# Patient Record
Sex: FEMALE | Race: WHITE | NOT HISPANIC OR LATINO | Employment: OTHER | URBAN - METROPOLITAN AREA
[De-identification: names, ages, dates, MRNs, and addresses within clinical notes are randomized per-mention and may not be internally consistent; named-entity substitution may affect disease eponyms.]

---

## 2018-06-08 ENCOUNTER — CONSULT (OUTPATIENT)
Dept: ONCOLOGY | Facility: CLINIC | Age: 68
End: 2018-06-08

## 2018-06-08 ENCOUNTER — LAB (OUTPATIENT)
Dept: LAB | Facility: HOSPITAL | Age: 68
End: 2018-06-08

## 2018-06-08 VITALS
SYSTOLIC BLOOD PRESSURE: 151 MMHG | HEART RATE: 69 BPM | BODY MASS INDEX: 36.19 KG/M2 | WEIGHT: 212 LBS | RESPIRATION RATE: 18 BRPM | OXYGEN SATURATION: 98 % | DIASTOLIC BLOOD PRESSURE: 71 MMHG | TEMPERATURE: 97.8 F | HEIGHT: 64 IN

## 2018-06-08 DIAGNOSIS — D69.6 THROMBOCYTOPENIA (HCC): Primary | ICD-10-CM

## 2018-06-08 DIAGNOSIS — D69.6 THROMBOCYTOPENIA (HCC): ICD-10-CM

## 2018-06-08 PROBLEM — D75.839 THROMBOCYTOSIS: Status: ACTIVE | Noted: 2018-06-08

## 2018-06-08 LAB
ERYTHROCYTE [DISTWIDTH] IN BLOOD BY AUTOMATED COUNT: 13.3 % (ref 11.3–14.5)
HCT VFR BLD AUTO: 45.3 % (ref 34.5–44)
HGB BLD-MCNC: 14.6 G/DL (ref 11.5–15.5)
LYMPHOCYTES # BLD AUTO: 2.2 10*3/MM3 (ref 0.6–4.8)
LYMPHOCYTES NFR BLD AUTO: 32.3 % (ref 24–44)
MCH RBC QN AUTO: 32 PG (ref 27–31)
MCHC RBC AUTO-ENTMCNC: 32.3 G/DL (ref 32–36)
MCV RBC AUTO: 99.1 FL (ref 80–99)
MONOCYTES # BLD AUTO: 0.6 10*3/MM3 (ref 0–1)
MONOCYTES NFR BLD AUTO: 9 % (ref 0–12)
NEUTROPHILS # BLD AUTO: 4.1 10*3/MM3 (ref 1.5–8.3)
NEUTROPHILS NFR BLD AUTO: 58.7 % (ref 41–71)
PLATELET # BLD AUTO: 119 10*3/MM3 (ref 150–450)
PLATELETS (CITRATED) BY AUTOMATED COUNT: 108 10*3/MM3 (ref 150–450)
PMV BLD AUTO: 8.3 FL (ref 6–12)
RBC # BLD AUTO: 4.58 10*6/MM3 (ref 3.89–5.14)
VIT B12 BLD-MCNC: 683 PG/ML (ref 211–911)
WBC NRBC COR # BLD: 6.9 10*3/MM3 (ref 3.5–10.8)

## 2018-06-08 PROCEDURE — 36415 COLL VENOUS BLD VENIPUNCTURE: CPT

## 2018-06-08 PROCEDURE — 85025 COMPLETE CBC W/AUTO DIFF WBC: CPT

## 2018-06-08 PROCEDURE — 82607 VITAMIN B-12: CPT

## 2018-06-08 PROCEDURE — 85049 AUTOMATED PLATELET COUNT: CPT

## 2018-06-08 PROCEDURE — 99204 OFFICE O/P NEW MOD 45 MIN: CPT | Performed by: INTERNAL MEDICINE

## 2018-06-08 RX ORDER — ASPIRIN 81 MG/1
81 TABLET ORAL DAILY
COMMUNITY

## 2018-06-08 RX ORDER — VALSARTAN AND HYDROCHLOROTHIAZIDE 320; 25 MG/1; MG/1
1 TABLET, FILM COATED ORAL DAILY
COMMUNITY

## 2018-06-08 RX ORDER — FUROSEMIDE 20 MG/1
20 TABLET ORAL AS NEEDED
COMMUNITY

## 2018-06-08 RX ORDER — FAMOTIDINE 40 MG/1
40 TABLET, FILM COATED ORAL DAILY
COMMUNITY

## 2018-06-08 RX ORDER — ESCITALOPRAM OXALATE 10 MG/1
10 TABLET ORAL DAILY
COMMUNITY

## 2018-06-08 RX ORDER — POTASSIUM CHLORIDE 750 MG/1
20 TABLET, FILM COATED, EXTENDED RELEASE ORAL DAILY
COMMUNITY

## 2018-06-08 NOTE — PROGRESS NOTES
ID: 67 y.o. year old female from [facility] [id number]    PCP: KENNETH Cr    REFERRING PHYSICIAN: KENNETH Cr    Reason for Consultation: Thrombocytopenia    Dear Ms. Edwards and Dr. Conn    It is a pleasure to meet Ms. Ford today.  As you know she is a very pleasant 67-year-old lady with diabetes who I'm seeing in consultation with newly diagnosed thrombocytopenia.  She reports no significant infections recently.  No fevers or chills.  No rashes.  She did start Pepcid recently.  However this predates that.  She denies any alcohol use.      Past Medical History:   Diagnosis Date   • Anxiety    • Arthritis    • Broken heart syndrome    • Diabetes    • High blood pressure    • Murmur        Past Surgical History:   Procedure Laterality Date   • BACK SURGERY  2005   • CHOLECYSTECTOMY OPEN     • HYSTERECTOMY      bso   • TUBAL ABDOMINAL LIGATION         Social History     Social History   • Marital status:    • Number of children: 3     Social History Main Topics   • Smoking status: Never Smoker   • Alcohol use No   • Drug use: No   • Sexual activity: Defer     Other Topics Concern   • Not on file       Family History   Problem Relation Age of Onset   • Aneurysm Mother    • Aneurysm Maternal Uncle        Review of Systems:    16 point review of systems was performed and reviewed and scanned into the EMR      Current Outpatient Prescriptions:   •  aspirin 81 MG EC tablet, Take 81 mg by mouth Daily., Disp: , Rfl:   •  Calcium Carbonate-Vitamin D (CALTRATE 600+D PO), Take  by mouth 2 (Two) Times a Day., Disp: , Rfl:   •  CINNAMON PO, Take 1,000 mg by mouth Daily., Disp: , Rfl:   •  escitalopram (LEXAPRO) 10 MG tablet, Take 10 mg by mouth Daily., Disp: , Rfl:   •  famotidine (PEPCID) 40 MG tablet, Take 40 mg by mouth Daily., Disp: , Rfl:   •  furosemide (LASIX) 20 MG tablet, Take 20 mg by mouth As Needed., Disp: , Rfl:   •  metFORMIN (GLUCOPHAGE) 500 MG tablet, Take 500 mg by mouth  Daily., Disp: , Rfl:   •  potassium chloride (K-DUR) 10 MEQ CR tablet, Take 20 mEq by mouth 2 (Two) Times a Day., Disp: , Rfl:   •  valsartan-hydrochlorothiazide (DIOVAN-HCT) 320-25 MG per tablet, Take 1 tablet by mouth Daily., Disp: , Rfl:     Allergies   Allergen Reactions   • Sulfa Antibiotics Nausea And Vomiting       ECOG SCORE: 0    Objective     Vitals:    06/08/18 1058   BP: 151/71   Pulse: 69   Resp: 18   Temp: 97.8 °F (36.6 °C)   SpO2: 98%       Physical Exam    General: well appearing, in no acute distress  HEENT: sclera anicteric, oropharynx clear, neck is supple  Lymphatics: no cervical, supraclavicular, or axillary adenopathy  Cardiovascular: regular rate and rhythm, no murmurs, rubs or gallops  Lungs: clear to auscultation bilaterally  Abdomen: soft, nontender, nondistended.  No palpable organomegaly  Extremities: no lower extremity edema  Skin: no rashes, lesions, bruising, or petechiae  Msk:  Shows no weakness of the large muscle groups  Psych: Mood is stable          Lab Results   Component Value Date    HGB 14.6 06/08/2018    HCT 45.3 (H) 06/08/2018    MCV 99.1 (H) 06/08/2018     (L) 06/08/2018     (L) 06/08/2018    WBC 6.90 06/08/2018    NEUTROABS 4.10 06/08/2018    LYMPHSABS 2.20 06/08/2018    MONOSABS 0.60 06/08/2018       ASSESSMENT:    67-year-old lady with mild thrombocytopenia.  This is likely related to an underlying viral infection.  It has improved since she was last checked.  There is also a possibility may be low-grade ITP.  Regardless no other cell lines affected I don't feel that she has a primary bone marrow process.  I think it's reasonable to just to close surveillance on her right now.  If she had ITP, we would start steroids only if her platelets drop below 50,000.  At a level of 108,000 she should behave fairly normal.  There should be no increased risk of bleeding and could undergo numerous surgeries without any transfusion support.  I went over the differential  with her today.  I explained the different reasons why platelets can be low.  We also have a plan going forward where we will perform a repeat lab in 6 weeks to see if this has been corrected.    I spent 45 minutes on the patient's plan and care with more than 50% of the time spent counseling the patient.    Thank you for allowing me to participate in the care of this patient.    Yours sincerely,    Stella Mock MD  [facility]  Hematology and Oncology    Orders Placed This Encounter   Procedures   • Platelet Ct On Citrated Bld     Standing Status:   Future     Number of Occurrences:   1     Standing Expiration Date:   6/8/2019   • Vitamin B12     Standing Status:   Future     Number of Occurrences:   1     Standing Expiration Date:   6/8/2019   • CBC & Differential     Standing Status:   Future     Number of Occurrences:   1     Standing Expiration Date:   6/8/2019     Order Specific Question:   Manual Differential     Answer:   No   • CBC & Differential     Standing Status:   Future     Standing Expiration Date:   6/8/2019     Order Specific Question:   Manual Differential     Answer:   No       Please note that portions of this note may have been completed with a voice recognition program. Efforts were made to edit the dictations, but occasionally words are mistranscribed.

## 2018-07-20 ENCOUNTER — OFFICE VISIT (OUTPATIENT)
Dept: ONCOLOGY | Facility: CLINIC | Age: 68
End: 2018-07-20

## 2018-07-20 ENCOUNTER — LAB (OUTPATIENT)
Dept: LAB | Facility: HOSPITAL | Age: 68
End: 2018-07-20

## 2018-07-20 VITALS
SYSTOLIC BLOOD PRESSURE: 145 MMHG | DIASTOLIC BLOOD PRESSURE: 70 MMHG | WEIGHT: 214 LBS | HEIGHT: 64 IN | BODY MASS INDEX: 36.54 KG/M2 | RESPIRATION RATE: 18 BRPM | HEART RATE: 66 BPM | TEMPERATURE: 97.8 F

## 2018-07-20 DIAGNOSIS — D69.6 THROMBOCYTOPENIA (HCC): ICD-10-CM

## 2018-07-20 DIAGNOSIS — D69.6 THROMBOCYTOPENIA (HCC): Primary | ICD-10-CM

## 2018-07-20 LAB
ERYTHROCYTE [DISTWIDTH] IN BLOOD BY AUTOMATED COUNT: 13.1 % (ref 11.3–14.5)
HCT VFR BLD AUTO: 43.6 % (ref 34.5–44)
HGB BLD-MCNC: 14.5 G/DL (ref 11.5–15.5)
LYMPHOCYTES # BLD AUTO: 1.8 10*3/MM3 (ref 0.6–4.8)
LYMPHOCYTES NFR BLD AUTO: 33.4 % (ref 24–44)
MCH RBC QN AUTO: 32.9 PG (ref 27–31)
MCHC RBC AUTO-ENTMCNC: 33.3 G/DL (ref 32–36)
MCV RBC AUTO: 98.9 FL (ref 80–99)
MONOCYTES # BLD AUTO: 0.5 10*3/MM3 (ref 0–1)
MONOCYTES NFR BLD AUTO: 8.9 % (ref 0–12)
NEUTROPHILS # BLD AUTO: 3.2 10*3/MM3 (ref 1.5–8.3)
NEUTROPHILS NFR BLD AUTO: 57.7 % (ref 41–71)
PLATELET # BLD AUTO: 107 10*3/MM3 (ref 150–450)
PMV BLD AUTO: 8 FL (ref 6–12)
RBC # BLD AUTO: 4.41 10*6/MM3 (ref 3.89–5.14)
WBC NRBC COR # BLD: 5.5 10*3/MM3 (ref 3.5–10.8)

## 2018-07-20 PROCEDURE — 36415 COLL VENOUS BLD VENIPUNCTURE: CPT

## 2018-07-20 PROCEDURE — 86677 HELICOBACTER PYLORI ANTIBODY: CPT

## 2018-07-20 PROCEDURE — 85025 COMPLETE CBC W/AUTO DIFF WBC: CPT

## 2018-07-20 PROCEDURE — 99213 OFFICE O/P EST LOW 20 MIN: CPT | Performed by: INTERNAL MEDICINE

## 2018-07-20 NOTE — PROGRESS NOTES
"PROBLEM LIST:  1 mild thrombocytopenia    REASON FOR VISIT: Thrombocytopenia    HISTORY OF PRESENT ILLNESS:   67 y.o.  female presents today for follow-up of her thrombocytopenia.  Clinically doing well.  No significant bruising.  No recent infections.    Past medical history, social history and family history was reviewed and unchanged from prior visit.    Review of Systems:    Review of Systems - Oncology   A comprehensive 14 point review of systems was performed and was negative except as mentioned.      Medications:  The current medication list was reviewed in the EMR    ALLERGIES:    Allergies   Allergen Reactions   • Sulfa Antibiotics Nausea And Vomiting         Physical Exam    VITAL SIGNS:  /70 Comment: LUE  Pulse 66   Temp 97.8 °F (36.6 °C) (Temporal Artery )   Resp 18   Ht 162.6 cm (64\")   Wt 97.1 kg (214 lb)   BMI 36.73 kg/m²     Wt Readings from Last 3 Encounters:   07/20/18 97.1 kg (214 lb)   06/08/18 96.2 kg (212 lb)        Performance Status: 0    General: well appearing, in no acute distress  HEENT: sclera anicteric, oropharynx clear, neck is supple  Lymphatics: no cervical, supraclavicular, or axillary adenopathy  Cardiovascular: regular rate and rhythm, no murmurs, rubs or gallops  Lungs: clear to auscultation bilaterally  Abdomen: soft, nontender, nondistended.  No palpable organomegaly  Extremities: no lower extremity edema  Skin: no rashes, lesions, bruising, or petechiae  Msk:  Shows no weakness of the large muscle groups  Psych: Mood is stable        RECENT LABS:    Lab Results   Component Value Date    HGB 14.5 07/20/2018    HCT 43.6 07/20/2018    MCV 98.9 07/20/2018     (L) 07/20/2018    WBC 5.50 07/20/2018    NEUTROABS 3.20 07/20/2018    LYMPHSABS 1.80 07/20/2018    MONOSABS 0.50 07/20/2018     Lab Results   Component Value Date     (L) 07/20/2018     (L) 06/08/2018     (L) 06/08/2018       Assessment/Plan    1.  Thrombocytopenia: Her platelet counts " are fairly stable.  I feel she may have underlying ITP.  I'm going to check an ultrasound of her abdomen and an H. pylori to see if those may be causing a problem.  Otherwise no changes for now.  We'll do observation only and see her back in 4 months.    I spent 15 minutes on the patient's plan and care with more than 50% of time spent counseling the patient.    Stella Mock MD  [facility] Hematology and Oncology    Return on: 11/16/18  Return in (Approximately): 4 months    Orders Placed This Encounter   Procedures   • US Abdomen Complete   • H. Pylori IgM, Blood   • CBC & Differential       7/20/2018         Please note that portions of this note may have been completed with a voice recognition program. Efforts were made to edit the dictations, but occasionally words are mistranscribed.

## 2018-07-23 LAB — H PYLORI IGM SER-ACNC: <9 UNITS (ref 0–8.9)

## 2018-11-08 ENCOUNTER — HOSPITAL ENCOUNTER (OUTPATIENT)
Age: 68
End: 2018-11-08
Payer: MEDICARE

## 2018-11-08 DIAGNOSIS — Z12.31: Primary | ICD-10-CM

## 2018-11-08 PROCEDURE — 77067 SCR MAMMO BI INCL CAD: CPT

## 2018-11-16 ENCOUNTER — OFFICE VISIT (OUTPATIENT)
Dept: ONCOLOGY | Facility: CLINIC | Age: 68
End: 2018-11-16

## 2018-11-16 ENCOUNTER — LAB (OUTPATIENT)
Dept: LAB | Facility: HOSPITAL | Age: 68
End: 2018-11-16

## 2018-11-16 VITALS
OXYGEN SATURATION: 96 % | RESPIRATION RATE: 18 BRPM | BODY MASS INDEX: 37.39 KG/M2 | SYSTOLIC BLOOD PRESSURE: 142 MMHG | HEART RATE: 72 BPM | HEIGHT: 64 IN | TEMPERATURE: 97.7 F | WEIGHT: 219 LBS | DIASTOLIC BLOOD PRESSURE: 68 MMHG

## 2018-11-16 DIAGNOSIS — D69.6 THROMBOCYTOPENIA (HCC): Primary | ICD-10-CM

## 2018-11-16 DIAGNOSIS — D69.6 THROMBOCYTOPENIA (HCC): ICD-10-CM

## 2018-11-16 LAB
ERYTHROCYTE [DISTWIDTH] IN BLOOD BY AUTOMATED COUNT: 13.3 % (ref 11.3–14.5)
HCT VFR BLD AUTO: 41.8 % (ref 34.5–44)
HGB BLD-MCNC: 14.2 G/DL (ref 11.5–15.5)
LYMPHOCYTES # BLD AUTO: 1.7 10*3/MM3 (ref 0.6–4.8)
LYMPHOCYTES NFR BLD AUTO: 33.6 % (ref 24–44)
MCH RBC QN AUTO: 34.1 PG (ref 27–31)
MCHC RBC AUTO-ENTMCNC: 33.9 G/DL (ref 32–36)
MCV RBC AUTO: 100.5 FL (ref 80–99)
MONOCYTES # BLD AUTO: 0.5 10*3/MM3 (ref 0–1)
MONOCYTES NFR BLD AUTO: 8.7 % (ref 0–12)
NEUTROPHILS # BLD AUTO: 3 10*3/MM3 (ref 1.5–8.3)
NEUTROPHILS NFR BLD AUTO: 57.7 % (ref 41–71)
PLATELET # BLD AUTO: 113 10*3/MM3 (ref 150–450)
PMV BLD AUTO: 8.1 FL (ref 6–12)
RBC # BLD AUTO: 4.16 10*6/MM3 (ref 3.89–5.14)
WBC NRBC COR # BLD: 5.2 10*3/MM3 (ref 3.5–10.8)

## 2018-11-16 PROCEDURE — 85025 COMPLETE CBC W/AUTO DIFF WBC: CPT

## 2018-11-16 PROCEDURE — 36415 COLL VENOUS BLD VENIPUNCTURE: CPT

## 2018-11-16 PROCEDURE — 99213 OFFICE O/P EST LOW 20 MIN: CPT | Performed by: INTERNAL MEDICINE

## 2018-11-16 RX ORDER — LOSARTAN POTASSIUM AND HYDROCHLOROTHIAZIDE 25; 100 MG/1; MG/1
1 TABLET ORAL DAILY
COMMUNITY

## 2018-11-16 NOTE — PROGRESS NOTES
"PROBLEM LIST:  1 mild thrombocytopenia  2. Portal Hypertension    REASON FOR VISIT: Thrombocytopenia    HISTORY OF PRESENT ILLNESS:   68 y.o.  female presents today for follow-up of her thrombocytopenia.  Clinically doing well.  No significant bruising.  No recent infections.    Past medical history, social history and family history was reviewed and unchanged from prior visit.    Review of Systems:    Review of Systems - Oncology   A comprehensive 14 point review of systems was performed and was negative except as mentioned.      Medications:  The current medication list was reviewed in the EMR    ALLERGIES:    Allergies   Allergen Reactions   • Sulfa Antibiotics Nausea And Vomiting         Physical Exam    VITAL SIGNS:  /68 Comment: LUE  Pulse 72   Temp 97.7 °F (36.5 °C) (Temporal)   Resp 18   Ht 162.6 cm (64\")   Wt 99.3 kg (219 lb)   SpO2 96% Comment: RA  BMI 37.59 kg/m²     Wt Readings from Last 3 Encounters:   11/16/18 99.3 kg (219 lb)   07/20/18 97.1 kg (214 lb)   06/08/18 96.2 kg (212 lb)        Performance Status: 0    General: well appearing, in no acute distress  HEENT: sclera anicteric, oropharynx clear, neck is supple  Lymphatics: no cervical, supraclavicular, or axillary adenopathy  Cardiovascular: regular rate and rhythm, no murmurs, rubs or gallops  Lungs: clear to auscultation bilaterally  Abdomen: soft, nontender, nondistended.  No palpable organomegaly  Extremities: no lower extremity edema  Skin: no rashes, lesions, bruising, or petechiae  Msk:  Shows no weakness of the large muscle groups  Psych: Mood is stable        RECENT LABS:    Lab Results   Component Value Date    HGB 14.2 11/16/2018    HCT 41.8 11/16/2018    .5 (H) 11/16/2018     (L) 11/16/2018    WBC 5.20 11/16/2018    NEUTROABS 3.00 11/16/2018    LYMPHSABS 1.70 11/16/2018    MONOSABS 0.50 11/16/2018     Lab Results   Component Value Date     (L) 11/16/2018     (L) 07/20/2018     (L) " 06/08/2018     (L) 06/08/2018         Assessment/Plan    1.  Thrombocytopenia: Her platelet counts are fairly stable.  I feel she may have underlying ITP though Her ultrasound of the liver suggest a component of portal hypertension.  This would suggest some sequela of fatty liver disease.  I have recommended consultation with hepatology.  She wants to wait and see.    I spent 15 minutes on the patient's plan and care with more than 50% of time spent counseling the patient.    Stella Mock MD  [facility] Hematology and Oncology    Return on: 05/10/19  Return in (Approximately): 6 months    Orders Placed This Encounter   Procedures   • Comprehensive Metabolic Panel   • CBC & Differential       11/16/2018         Please note that portions of this note may have been completed with a voice recognition program. Efforts were made to edit the dictations, but occasionally words are mistranscribed.

## 2019-05-10 ENCOUNTER — OFFICE VISIT (OUTPATIENT)
Dept: ONCOLOGY | Facility: CLINIC | Age: 69
End: 2019-05-10

## 2019-05-10 ENCOUNTER — LAB (OUTPATIENT)
Dept: LAB | Facility: HOSPITAL | Age: 69
End: 2019-05-10

## 2019-05-10 VITALS
HEIGHT: 64 IN | BODY MASS INDEX: 37.73 KG/M2 | TEMPERATURE: 98.5 F | WEIGHT: 221 LBS | DIASTOLIC BLOOD PRESSURE: 75 MMHG | OXYGEN SATURATION: 94 % | HEART RATE: 65 BPM | SYSTOLIC BLOOD PRESSURE: 145 MMHG | RESPIRATION RATE: 18 BRPM

## 2019-05-10 DIAGNOSIS — D69.6 THROMBOCYTOPENIA (HCC): Primary | ICD-10-CM

## 2019-05-10 DIAGNOSIS — D69.6 THROMBOCYTOPENIA (HCC): ICD-10-CM

## 2019-05-10 LAB
ALBUMIN SERPL-MCNC: 3.8 G/DL (ref 3.5–5.2)
ALBUMIN/GLOB SERPL: 1 G/DL
ALP SERPL-CCNC: 120 U/L (ref 39–117)
ALT SERPL W P-5'-P-CCNC: 46 U/L (ref 1–33)
ANION GAP SERPL CALCULATED.3IONS-SCNC: 11 MMOL/L
AST SERPL-CCNC: 61 U/L (ref 1–32)
BILIRUB SERPL-MCNC: 0.9 MG/DL (ref 0.2–1.2)
BUN BLD-MCNC: 12 MG/DL (ref 8–23)
BUN/CREAT SERPL: 21.1 (ref 7–25)
CALCIUM SPEC-SCNC: 9.6 MG/DL (ref 8.6–10.5)
CHLORIDE SERPL-SCNC: 102 MMOL/L (ref 98–107)
CO2 SERPL-SCNC: 28 MMOL/L (ref 22–29)
CREAT BLD-MCNC: 0.57 MG/DL (ref 0.57–1)
ERYTHROCYTE [DISTWIDTH] IN BLOOD BY AUTOMATED COUNT: 12.9 % (ref 12.3–15.4)
GFR SERPL CREATININE-BSD FRML MDRD: 105 ML/MIN/1.73
GLOBULIN UR ELPH-MCNC: 3.7 GM/DL
GLUCOSE BLD-MCNC: 171 MG/DL (ref 65–99)
HCT VFR BLD AUTO: 42.3 % (ref 34–46.6)
HGB BLD-MCNC: 14.3 G/DL (ref 12–15.9)
LYMPHOCYTES # BLD AUTO: 1.6 10*3/MM3 (ref 0.7–3.1)
LYMPHOCYTES NFR BLD AUTO: 29.3 % (ref 19.6–45.3)
MCH RBC QN AUTO: 34.3 PG (ref 26.6–33)
MCHC RBC AUTO-ENTMCNC: 33.9 G/DL (ref 31.5–35.7)
MCV RBC AUTO: 101.1 FL (ref 79–97)
MONOCYTES # BLD AUTO: 0.3 10*3/MM3 (ref 0.1–0.9)
MONOCYTES NFR BLD AUTO: 4.9 % (ref 5–12)
NEUTROPHILS # BLD AUTO: 3.6 10*3/MM3 (ref 1.7–7)
NEUTROPHILS NFR BLD AUTO: 65.8 % (ref 42.7–76)
PLATELET # BLD AUTO: 96 10*3/MM3 (ref 140–450)
PMV BLD AUTO: 8.4 FL (ref 6–12)
POTASSIUM BLD-SCNC: 4 MMOL/L (ref 3.5–5.2)
PROT SERPL-MCNC: 7.5 G/DL (ref 6–8.5)
RBC # BLD AUTO: 4.18 10*6/MM3 (ref 3.77–5.28)
SODIUM BLD-SCNC: 141 MMOL/L (ref 136–145)
WBC NRBC COR # BLD: 5.4 10*3/MM3 (ref 3.4–10.8)

## 2019-05-10 PROCEDURE — 36415 COLL VENOUS BLD VENIPUNCTURE: CPT

## 2019-05-10 PROCEDURE — 85025 COMPLETE CBC W/AUTO DIFF WBC: CPT

## 2019-05-10 PROCEDURE — 80053 COMPREHEN METABOLIC PANEL: CPT

## 2019-05-10 PROCEDURE — 99213 OFFICE O/P EST LOW 20 MIN: CPT | Performed by: INTERNAL MEDICINE

## 2019-05-10 NOTE — PROGRESS NOTES
"PROBLEM LIST:  1 mild thrombocytopenia  2. Portal Hypertension    REASON FOR VISIT: Thrombocytopenia    HISTORY OF PRESENT ILLNESS:   68 y.o.  female presents today for follow-up of her thrombocytopenia.  Clinically doing well.  No significant bruising.  No recent infections.    Past medical history, social history and family history was reviewed and unchanged from prior visit.    Review of Systems:    Review of Systems   Constitutional: Negative.    HENT:  Negative.    Eyes: Negative.    Respiratory: Negative.    Cardiovascular: Negative.    Gastrointestinal: Negative.    Endocrine: Negative.    Genitourinary: Negative.     Musculoskeletal: Negative.    Skin: Negative.    Neurological: Negative.    Hematological: Negative.    Psychiatric/Behavioral: Negative.       A comprehensive 14 point review of systems was performed and was negative except as mentioned.      Medications:  The current medication list was reviewed in the EMR    ALLERGIES:    Allergies   Allergen Reactions   • Sulfa Antibiotics Nausea And Vomiting         Physical Exam    VITAL SIGNS:  /75 Comment: LUE  Pulse 65   Temp 98.5 °F (36.9 °C) (Temporal)   Resp 18   Ht 162.6 cm (64\")   Wt 100 kg (221 lb)   SpO2 94% Comment: RA  BMI 37.93 kg/m²     Wt Readings from Last 3 Encounters:   05/10/19 100 kg (221 lb)   11/16/18 99.3 kg (219 lb)   07/20/18 97.1 kg (214 lb)        Performance Status: 0    General: well appearing, in no acute distress  HEENT: sclera anicteric, oropharynx clear, neck is supple  Lymphatics: no cervical, supraclavicular, or axillary adenopathy  Cardiovascular: regular rate and rhythm, no murmurs, rubs or gallops  Lungs: clear to auscultation bilaterally  Abdomen: soft, nontender, nondistended.  No palpable organomegaly  Extremities: no lower extremity edema  Skin: no rashes, lesions, bruising, or petechiae  Msk:  Shows no weakness of the large muscle groups  Psych: Mood is stable        RECENT LABS:    Lab Results "   Component Value Date    HGB 14.3 05/10/2019    HCT 42.3 05/10/2019    .1 (H) 05/10/2019    PLT 96 (L) 05/10/2019    WBC 5.40 05/10/2019    NEUTROABS 3.60 05/10/2019    LYMPHSABS 1.60 05/10/2019    MONOSABS 0.30 05/10/2019     Lab Results   Component Value Date    PLT 96 (L) 05/10/2019     (L) 11/16/2018     (L) 07/20/2018     (L) 06/08/2018     (L) 06/08/2018         Assessment/Plan    1.  Thrombocytopenia: Her platelet counts are fairly stable.  I feel she may have underlying ITP though Her ultrasound of the liver suggest a component of portal hypertension.  This would suggest some sequela of fatty liver disease.  No intervention at this time.    I spent a total of 15 minutes in direct patient care, greater than 10  minutes (greater than 50%) were spent in coordination of care, and counseling the patient regarding thrombocytopenia. Answered any questions patient had with the plan.      Stella Mock MD  [facility] Hematology and Oncology    Return on: 11/15/19  Return in (Approximately): 6 months    Orders Placed This Encounter   Procedures   • CBC & Differential       5/10/2019         Please note that portions of this note may have been completed with a voice recognition program. Efforts were made to edit the dictations, but occasionally words are mistranscribed.

## 2019-11-15 ENCOUNTER — LAB (OUTPATIENT)
Dept: LAB | Facility: HOSPITAL | Age: 69
End: 2019-11-15

## 2019-11-15 ENCOUNTER — OFFICE VISIT (OUTPATIENT)
Dept: ONCOLOGY | Facility: CLINIC | Age: 69
End: 2019-11-15

## 2019-11-15 VITALS
HEIGHT: 64 IN | SYSTOLIC BLOOD PRESSURE: 139 MMHG | BODY MASS INDEX: 38.76 KG/M2 | WEIGHT: 227 LBS | OXYGEN SATURATION: 96 % | RESPIRATION RATE: 18 BRPM | TEMPERATURE: 97.9 F | DIASTOLIC BLOOD PRESSURE: 65 MMHG | HEART RATE: 54 BPM

## 2019-11-15 DIAGNOSIS — K76.6 PORTAL HYPERTENSION (HCC): ICD-10-CM

## 2019-11-15 DIAGNOSIS — D69.6 THROMBOCYTOPENIA (HCC): ICD-10-CM

## 2019-11-15 DIAGNOSIS — D69.6 THROMBOCYTOPENIA (HCC): Primary | ICD-10-CM

## 2019-11-15 LAB
ERYTHROCYTE [DISTWIDTH] IN BLOOD BY AUTOMATED COUNT: 13.9 % (ref 12.3–15.4)
HCT VFR BLD AUTO: 42.5 % (ref 34–46.6)
HGB BLD-MCNC: 14.2 G/DL (ref 12–15.9)
LYMPHOCYTES # BLD AUTO: 1.4 10*3/MM3 (ref 0.7–3.1)
LYMPHOCYTES NFR BLD AUTO: 29.6 % (ref 19.6–45.3)
MCH RBC QN AUTO: 34.3 PG (ref 26.6–33)
MCHC RBC AUTO-ENTMCNC: 33.5 G/DL (ref 31.5–35.7)
MCV RBC AUTO: 102.2 FL (ref 79–97)
MONOCYTES # BLD AUTO: 0.4 10*3/MM3 (ref 0.1–0.9)
MONOCYTES NFR BLD AUTO: 7.3 % (ref 5–12)
NEUTROPHILS # BLD AUTO: 3 10*3/MM3 (ref 1.7–7)
NEUTROPHILS NFR BLD AUTO: 63.1 % (ref 42.7–76)
PLATELET # BLD AUTO: 88 10*3/MM3 (ref 140–450)
PMV BLD AUTO: 7.9 FL (ref 6–12)
RBC # BLD AUTO: 4.15 10*6/MM3 (ref 3.77–5.28)
WBC NRBC COR # BLD: 4.8 10*3/MM3 (ref 3.4–10.8)

## 2019-11-15 PROCEDURE — 36415 COLL VENOUS BLD VENIPUNCTURE: CPT

## 2019-11-15 PROCEDURE — 85025 COMPLETE CBC W/AUTO DIFF WBC: CPT

## 2019-11-15 PROCEDURE — 99213 OFFICE O/P EST LOW 20 MIN: CPT | Performed by: INTERNAL MEDICINE

## 2019-11-15 NOTE — PROGRESS NOTES
"PROBLEM LIST:  1 mild thrombocytopenia  2. Portal Hypertension    REASON FOR VISIT: Thrombocytopenia    HISTORY OF PRESENT ILLNESS:   69 y.o.  female presents today for follow-up of her thrombocytopenia.  Clinically doing well.  No significant bruising.  No recent infections.    Past medical history, social history and family history was reviewed and unchanged from prior visit.    Review of Systems:    Review of Systems   Constitutional: Negative.    HENT:  Negative.    Eyes: Negative.    Respiratory: Negative.    Cardiovascular: Negative.    Gastrointestinal: Negative.    Endocrine: Negative.    Genitourinary: Negative.     Musculoskeletal: Negative.    Skin: Negative.    Neurological: Negative.    Hematological: Negative.    Psychiatric/Behavioral: Negative.       A comprehensive 14 point review of systems was performed and was negative except as mentioned.      Medications:  The current medication list was reviewed in the EMR    ALLERGIES:    Allergies   Allergen Reactions   • Sulfa Antibiotics Nausea And Vomiting         Physical Exam    VITAL SIGNS:  /65 Comment: LUE  Pulse 54   Temp 97.9 °F (36.6 °C) (Temporal)   Resp 18   Ht 162.6 cm (64\")   Wt 103 kg (227 lb)   SpO2 96% Comment: RA  BMI 38.96 kg/m²     Wt Readings from Last 3 Encounters:   11/15/19 103 kg (227 lb)   05/10/19 100 kg (221 lb)   11/16/18 99.3 kg (219 lb)        Performance Status: 0    General: well appearing, in no acute distress  HEENT: sclera anicteric, oropharynx clear, neck is supple  Lymphatics: no cervical, supraclavicular, or axillary adenopathy  Cardiovascular: regular rate and rhythm, no murmurs, rubs or gallops  Lungs: clear to auscultation bilaterally  Abdomen: soft, nontender, nondistended.  No palpable organomegaly  Extremities: no lower extremity edema  Skin: no rashes, lesions, bruising, or petechiae  Msk:  Shows no weakness of the large muscle groups  Psych: Mood is stable        RECENT LABS:    Lab Results "   Component Value Date    HGB 14.2 11/15/2019    HCT 42.5 11/15/2019    .2 (H) 11/15/2019    PLT 88 (L) 11/15/2019    WBC 4.80 11/15/2019    NEUTROABS 3.00 11/15/2019    LYMPHSABS 1.40 11/15/2019    MONOSABS 0.40 11/15/2019     Lab Results   Component Value Date    PLT 88 (L) 11/15/2019    PLT 96 (L) 05/10/2019     (L) 11/16/2018     (L) 07/20/2018     (L) 06/08/2018     (L) 06/08/2018         Assessment/Plan    1.  Thrombocytopenia: Her platelet counts are fairly stable.  I feel she may have underlying ITP though Her ultrasound of the liver suggest a component of portal hypertension.  This would suggest some sequela of fatty liver disease.  I am going to have her see  Hepatology at  just to make sure we are not dealing with anything in the liver.  She may have early stages of cirrhosis.    I spent a total of 15 minutes in direct patient care, greater than 10  minutes (greater than 50%) were spent in coordination of care, and counseling the patient regarding thrombocytopenia. Answered any questions patient had with the plan.      Stella Mock MD  [facility] Hematology and Oncology         No orders of the defined types were placed in this encounter.      11/15/2019         Please note that portions of this note may have been completed with a voice recognition program. Efforts were made to edit the dictations, but occasionally words are mistranscribed.

## 2019-12-19 ENCOUNTER — HOSPITAL ENCOUNTER (OUTPATIENT)
Age: 69
End: 2019-12-19
Payer: MEDICARE

## 2019-12-19 DIAGNOSIS — Z12.31: Primary | ICD-10-CM

## 2019-12-19 PROCEDURE — 77067 SCR MAMMO BI INCL CAD: CPT

## 2020-05-13 ENCOUNTER — TELEPHONE (OUTPATIENT)
Dept: ONCOLOGY | Facility: CLINIC | Age: 70
End: 2020-05-13

## 2020-12-21 ENCOUNTER — HOSPITAL ENCOUNTER (OUTPATIENT)
Age: 70
End: 2020-12-21
Payer: MEDICARE

## 2020-12-21 DIAGNOSIS — Z12.31: Primary | ICD-10-CM

## 2020-12-21 PROCEDURE — 77067 SCR MAMMO BI INCL CAD: CPT

## 2020-12-21 PROCEDURE — 77063 BREAST TOMOSYNTHESIS BI: CPT

## 2021-06-15 ENCOUNTER — HOSPITAL ENCOUNTER (OUTPATIENT)
Age: 71
End: 2021-06-15
Payer: MEDICARE

## 2021-06-15 DIAGNOSIS — R94.5: ICD-10-CM

## 2021-06-15 DIAGNOSIS — E78.5: ICD-10-CM

## 2021-06-15 DIAGNOSIS — E55.9: ICD-10-CM

## 2021-06-15 DIAGNOSIS — Z79.84: ICD-10-CM

## 2021-06-15 DIAGNOSIS — E11.9: ICD-10-CM

## 2021-06-15 DIAGNOSIS — I10: ICD-10-CM

## 2021-06-15 DIAGNOSIS — D69.6: Primary | ICD-10-CM

## 2021-06-15 LAB
25-OH VITAMIN D, TOTAL: 41 NG/ML (ref 30–100)
ALBUMIN LEVEL: 3.3 G/DL (ref 3.5–5)
ALBUMIN/GLOB SERPL: 1 {RATIO} (ref 1.1–1.8)
ALP ISO SERPL-ACNC: 116 U/L (ref 38–126)
ALT SERPLBLD-CCNC: 38 U/L (ref 12–78)
ANION GAP SERPL CALC-SCNC: 5.9 MEQ/L (ref 5–15)
AST SERPL QL: 55 U/L (ref 14–36)
BILIRUBIN,TOTAL: 1.6 MG/DL (ref 0.2–1.3)
BUN SERPL-MCNC: 11 MG/DL (ref 7–17)
CALCIUM SPEC-MCNC: 8.9 MG/DL (ref 8.4–10.2)
CHLORIDE SPEC-SCNC: 108 MMOL/L (ref 98–107)
CHOLEST SPEC-SCNC: 151 MG/DL (ref 140–200)
CO2 SERPL-SCNC: 31 MMOL/L (ref 22–30)
CREAT BLD-SCNC: 0.6 MG/DL (ref 0.52–1.04)
ESTIMATED GLOMERULAR FILT RATE: 99 ML/MIN (ref 60–?)
GFR (AFRICAN AMERICAN): 119 ML/MIN (ref 60–?)
GLOBULIN SER CALC-MCNC: 3.2 G/DL (ref 1.3–3.2)
GLUCOSE: 124 MG/DL (ref 74–100)
HBA1C MFR BLD: 6.4 % (ref 4–6)
HCT VFR BLD CALC: 40.5 % (ref 37–47)
HDLC SERPL-MCNC: 47 MG/DL (ref 40–60)
HGB BLD-MCNC: 14.4 G/DL (ref 12.2–16.2)
MCHC RBC-ENTMCNC: 35.7 G/DL (ref 31.8–35.4)
MCV RBC: 98.3 FL (ref 81–99)
MEAN CORPUSCULAR HEMOGLOBIN: 35.1 PG (ref 27–31.2)
PLATELET # BLD: 82 K/MM3 (ref 142–424)
POTASSIUM: 3.9 MMOL/L (ref 3.5–5.1)
PROT SERPL-MCNC: 6.5 G/DL (ref 6.3–8.2)
RBC # BLD AUTO: 4.12 M/MM3 (ref 4.2–5.4)
SODIUM SPEC-SCNC: 141 MMOL/L (ref 136–145)
TRIGLYCERIDES: 68 MG/DL (ref 30–150)
TSH SERPL-ACNC: 2.32 UIU/ML (ref 0.47–4.68)
WBC # BLD AUTO: 6.6 K/MM3 (ref 4.8–10.8)

## 2021-06-15 PROCEDURE — 80053 COMPREHEN METABOLIC PANEL: CPT

## 2021-06-15 PROCEDURE — 80061 LIPID PANEL: CPT

## 2021-06-15 PROCEDURE — 82306 VITAMIN D 25 HYDROXY: CPT

## 2021-06-15 PROCEDURE — 36415 COLL VENOUS BLD VENIPUNCTURE: CPT

## 2021-06-15 PROCEDURE — 83036 HEMOGLOBIN GLYCOSYLATED A1C: CPT

## 2021-06-15 PROCEDURE — 85025 COMPLETE CBC W/AUTO DIFF WBC: CPT

## 2021-06-15 PROCEDURE — 84443 ASSAY THYROID STIM HORMONE: CPT

## 2021-09-29 ENCOUNTER — HOSPITAL ENCOUNTER (OUTPATIENT)
Age: 71
End: 2021-09-29
Payer: MEDICARE

## 2021-09-29 DIAGNOSIS — Z79.84: ICD-10-CM

## 2021-09-29 DIAGNOSIS — D69.6: ICD-10-CM

## 2021-09-29 DIAGNOSIS — E11.9: Primary | ICD-10-CM

## 2021-09-29 LAB
ALBUMIN LEVEL: 3.1 G/DL (ref 3.5–5)
ALBUMIN/GLOB SERPL: 1 {RATIO} (ref 1.1–1.8)
ALP ISO SERPL-ACNC: 120 U/L (ref 38–126)
ALT SERPLBLD-CCNC: 38 U/L (ref 12–78)
ANION GAP SERPL CALC-SCNC: 4.9 MEQ/L (ref 5–15)
AST SERPL QL: 65 U/L (ref 14–36)
BILIRUBIN,TOTAL: 1.4 MG/DL (ref 0.2–1.3)
BUN SERPL-MCNC: 12 MG/DL (ref 7–17)
CALCIUM SPEC-MCNC: 9.1 MG/DL (ref 8.4–10.2)
CHLORIDE SPEC-SCNC: 110 MMOL/L (ref 98–107)
CO2 SERPL-SCNC: 29 MMOL/L (ref 22–30)
CREAT BLD-SCNC: 0.5 MG/DL (ref 0.52–1.04)
ESTIMATED GLOMERULAR FILT RATE: 121 ML/MIN (ref 60–?)
GFR (AFRICAN AMERICAN): 147 ML/MIN (ref 60–?)
GLOBULIN SER CALC-MCNC: 3.2 G/DL (ref 1.3–3.2)
GLUCOSE: 135 MG/DL (ref 74–100)
HBA1C MFR BLD: 6.6 % (ref 4–6)
HCT VFR BLD CALC: 42.4 % (ref 37–47)
HGB BLD-MCNC: 14.6 G/DL (ref 12.2–16.2)
MCHC RBC-ENTMCNC: 34.5 G/DL (ref 31.8–35.4)
MCV RBC: 102.4 FL (ref 81–99)
MEAN CORPUSCULAR HEMOGLOBIN: 35.4 PG (ref 27–31.2)
PLATELET # BLD: 86 K/MM3 (ref 142–424)
POTASSIUM: 3.9 MMOL/L (ref 3.5–5.1)
PROT SERPL-MCNC: 6.3 G/DL (ref 6.3–8.2)
RBC # BLD AUTO: 4.14 M/MM3 (ref 4.2–5.4)
SODIUM SPEC-SCNC: 140 MMOL/L (ref 136–145)
WBC # BLD AUTO: 5.2 K/MM3 (ref 4.8–10.8)

## 2021-09-29 PROCEDURE — 80053 COMPREHEN METABOLIC PANEL: CPT

## 2021-09-29 PROCEDURE — 36415 COLL VENOUS BLD VENIPUNCTURE: CPT

## 2021-09-29 PROCEDURE — 83036 HEMOGLOBIN GLYCOSYLATED A1C: CPT

## 2021-09-29 PROCEDURE — 85025 COMPLETE CBC W/AUTO DIFF WBC: CPT

## 2021-12-27 ENCOUNTER — HOSPITAL ENCOUNTER (OUTPATIENT)
Age: 71
End: 2021-12-27
Payer: MEDICARE

## 2021-12-27 DIAGNOSIS — Z12.31: Primary | ICD-10-CM

## 2021-12-27 PROCEDURE — 77063 BREAST TOMOSYNTHESIS BI: CPT

## 2021-12-27 PROCEDURE — 77067 SCR MAMMO BI INCL CAD: CPT

## 2021-12-30 ENCOUNTER — HOSPITAL ENCOUNTER (OUTPATIENT)
Age: 71
End: 2021-12-30
Payer: MEDICARE

## 2021-12-30 DIAGNOSIS — I10: ICD-10-CM

## 2021-12-30 DIAGNOSIS — D69.6: ICD-10-CM

## 2021-12-30 DIAGNOSIS — E55.9: ICD-10-CM

## 2021-12-30 DIAGNOSIS — Z79.84: ICD-10-CM

## 2021-12-30 DIAGNOSIS — E11.9: Primary | ICD-10-CM

## 2021-12-30 LAB
25-OH VITAMIN D, TOTAL: 47.6 NG/ML (ref 30–100)
ALBUMIN LEVEL: 3 G/DL (ref 3.5–5)
ALBUMIN/GLOB SERPL: 0.9 {RATIO} (ref 1.1–1.8)
ALP ISO SERPL-ACNC: 112 U/L (ref 38–126)
ALT SERPLBLD-CCNC: 42 U/L (ref 12–78)
ANION GAP SERPL CALC-SCNC: 8.1 MEQ/L (ref 5–15)
AST SERPL QL: 73 U/L (ref 14–36)
BILIRUBIN,TOTAL: 1.4 MG/DL (ref 0.2–1.3)
BUN SERPL-MCNC: 11 MG/DL (ref 7–17)
CALCIUM SPEC-MCNC: 8.8 MG/DL (ref 8.4–10.2)
CHLORIDE SPEC-SCNC: 108 MMOL/L (ref 98–107)
CHOLEST SPEC-SCNC: 157 MG/DL (ref 140–200)
CO2 SERPL-SCNC: 29 MMOL/L (ref 22–30)
CREAT BLD-SCNC: 0.6 MG/DL (ref 0.52–1.04)
ESTIMATED GLOMERULAR FILT RATE: 98 ML/MIN (ref 60–?)
GFR (AFRICAN AMERICAN): 119 ML/MIN (ref 60–?)
GLOBULIN SER CALC-MCNC: 3.2 G/DL (ref 1.3–3.2)
GLUCOSE: 144 MG/DL (ref 74–100)
HBA1C MFR BLD: 7 % (ref 4–6)
HCT VFR BLD CALC: 41.5 % (ref 37–47)
HDLC SERPL-MCNC: 41 MG/DL (ref 40–60)
HGB BLD-MCNC: 13.8 G/DL (ref 12.2–16.2)
MCHC RBC-ENTMCNC: 33.3 G/DL (ref 31.8–35.4)
MCV RBC: 103.6 FL (ref 81–99)
MEAN CORPUSCULAR HEMOGLOBIN: 34.5 PG (ref 27–31.2)
PLATELET # BLD: 76 K/MM3 (ref 142–424)
POTASSIUM: 4.1 MMOL/L (ref 3.5–5.1)
PROT SERPL-MCNC: 6.2 G/DL (ref 6.3–8.2)
RBC # BLD AUTO: 4 M/MM3 (ref 4.2–5.4)
SODIUM SPEC-SCNC: 141 MMOL/L (ref 136–145)
TRIGLYCERIDES: 81 MG/DL (ref 30–150)
WBC # BLD AUTO: 4.8 K/MM3 (ref 4.8–10.8)

## 2021-12-30 PROCEDURE — 82306 VITAMIN D 25 HYDROXY: CPT

## 2021-12-30 PROCEDURE — 85025 COMPLETE CBC W/AUTO DIFF WBC: CPT

## 2021-12-30 PROCEDURE — 83036 HEMOGLOBIN GLYCOSYLATED A1C: CPT

## 2021-12-30 PROCEDURE — 80053 COMPREHEN METABOLIC PANEL: CPT

## 2021-12-30 PROCEDURE — 80061 LIPID PANEL: CPT

## 2021-12-30 PROCEDURE — 36415 COLL VENOUS BLD VENIPUNCTURE: CPT

## 2022-04-05 ENCOUNTER — HOSPITAL ENCOUNTER (OUTPATIENT)
Age: 72
End: 2022-04-05
Payer: MEDICARE

## 2022-04-05 DIAGNOSIS — B95.1: ICD-10-CM

## 2022-04-05 DIAGNOSIS — K52.9: Primary | ICD-10-CM

## 2022-04-05 DIAGNOSIS — L03.116: ICD-10-CM

## 2022-04-05 LAB
ALBUMIN LEVEL: 2.9 G/DL (ref 3.5–5)
ALBUMIN/GLOB SERPL: 0.9 {RATIO} (ref 1.1–1.8)
ALP ISO SERPL-ACNC: 91 U/L (ref 38–126)
ALT SERPLBLD-CCNC: 48 U/L (ref 12–78)
ANION GAP SERPL CALC-SCNC: 6.4 MEQ/L (ref 5–15)
AST SERPL QL: 61 U/L (ref 14–36)
BILIRUBIN,TOTAL: 2.3 MG/DL (ref 0.2–1.3)
BUN SERPL-MCNC: 22 MG/DL (ref 7–17)
CALCIUM SPEC-MCNC: 8.6 MG/DL (ref 8.4–10.2)
CHLORIDE SPEC-SCNC: 102 MMOL/L (ref 98–107)
CO2 SERPL-SCNC: 29 MMOL/L (ref 22–30)
CREAT BLD-SCNC: 0.8 MG/DL (ref 0.52–1.04)
CRP SERPL HS-MCNC: 143.6 MG/L (ref 0–4)
ESTIMATED GLOMERULAR FILT RATE: 71 ML/MIN (ref 60–?)
GFR (AFRICAN AMERICAN): 85 ML/MIN (ref 60–?)
GLOBULIN SER CALC-MCNC: 3.1 G/DL (ref 1.3–3.2)
GLUCOSE: 165 MG/DL (ref 74–100)
HCT VFR BLD CALC: 41.3 % (ref 37–47)
HGB BLD-MCNC: 14.2 G/DL (ref 12.2–16.2)
MCHC RBC-ENTMCNC: 34.4 G/DL (ref 31.8–35.4)
MCV RBC: 105.2 FL (ref 81–99)
MEAN CORPUSCULAR HEMOGLOBIN: 36.1 PG (ref 27–31.2)
PLATELET # BLD: 85 K/MM3 (ref 142–424)
POTASSIUM: 3.4 MMOL/L (ref 3.5–5.1)
PROT SERPL-MCNC: 6 G/DL (ref 6.3–8.2)
RBC # BLD AUTO: 3.93 M/MM3 (ref 4.2–5.4)
SODIUM SPEC-SCNC: 134 MMOL/L (ref 136–145)
WBC # BLD AUTO: 14.2 K/MM3 (ref 4.8–10.8)

## 2022-04-05 PROCEDURE — 80053 COMPREHEN METABOLIC PANEL: CPT

## 2022-04-05 PROCEDURE — 87186 SC STD MICRODIL/AGAR DIL: CPT

## 2022-04-05 PROCEDURE — 87070 CULTURE OTHR SPECIMN AEROBIC: CPT

## 2022-04-05 PROCEDURE — 36415 COLL VENOUS BLD VENIPUNCTURE: CPT

## 2022-04-05 PROCEDURE — 87077 CULTURE AEROBIC IDENTIFY: CPT

## 2022-04-05 PROCEDURE — 85025 COMPLETE CBC W/AUTO DIFF WBC: CPT

## 2022-04-05 PROCEDURE — 86140 C-REACTIVE PROTEIN: CPT

## 2022-04-05 PROCEDURE — 87205 SMEAR GRAM STAIN: CPT

## 2022-04-05 PROCEDURE — 85651 RBC SED RATE NONAUTOMATED: CPT

## 2022-04-06 ENCOUNTER — HOSPITAL ENCOUNTER (OUTPATIENT)
Age: 72
End: 2022-04-06
Payer: MEDICARE

## 2022-04-06 DIAGNOSIS — L03.116: Primary | ICD-10-CM

## 2022-04-06 PROCEDURE — 73590 X-RAY EXAM OF LOWER LEG: CPT

## 2022-10-12 ENCOUNTER — HOSPITAL ENCOUNTER (OUTPATIENT)
Age: 72
End: 2022-10-12
Payer: MEDICARE

## 2022-10-12 DIAGNOSIS — E11.9: Primary | ICD-10-CM

## 2022-10-12 DIAGNOSIS — D69.6: ICD-10-CM

## 2022-10-12 DIAGNOSIS — Z79.84: ICD-10-CM

## 2022-10-12 LAB
ALBUMIN LEVEL: 3.1 G/DL (ref 3.5–5)
ALBUMIN/GLOB SERPL: 1 {RATIO} (ref 1.1–1.8)
ALP ISO SERPL-ACNC: 155 U/L (ref 38–126)
ALT SERPLBLD-CCNC: 37 U/L (ref 12–78)
ANION GAP SERPL CALC-SCNC: 9.2 MEQ/L (ref 5–15)
AST SERPL QL: 76 U/L (ref 14–36)
BILIRUBIN,TOTAL: 1.3 MG/DL (ref 0.2–1.3)
BUN SERPL-MCNC: 12 MG/DL (ref 7–17)
CALCIUM SPEC-MCNC: 9 MG/DL (ref 8.4–10.2)
CHLORIDE SPEC-SCNC: 105 MMOL/L (ref 98–107)
CO2 SERPL-SCNC: 29 MMOL/L (ref 22–30)
CREAT BLD-SCNC: 0.6 MG/DL (ref 0.52–1.04)
ESTIMATED GLOMERULAR FILT RATE: 98 ML/MIN (ref 60–?)
GFR (AFRICAN AMERICAN): 119 ML/MIN (ref 60–?)
GLOBULIN SER CALC-MCNC: 3.2 G/DL (ref 1.3–3.2)
GLUCOSE: 137 MG/DL (ref 74–100)
HBA1C MFR BLD: 6.3 % (ref 4–6)
HCT VFR BLD CALC: 41 % (ref 37–47)
HGB BLD-MCNC: 13.9 G/DL (ref 12.2–16.2)
MCHC RBC-ENTMCNC: 33.8 G/DL (ref 31.8–35.4)
MCV RBC: 103.2 FL (ref 81–99)
MEAN CORPUSCULAR HEMOGLOBIN: 34.9 PG (ref 27–31.2)
PLATELET # BLD: 89 K/MM3 (ref 142–424)
POTASSIUM: 4.2 MMOL/L (ref 3.5–5.1)
PROT SERPL-MCNC: 6.3 G/DL (ref 6.3–8.2)
RBC # BLD AUTO: 3.97 M/MM3 (ref 4.2–5.4)
SODIUM SPEC-SCNC: 139 MMOL/L (ref 136–145)
WBC # BLD AUTO: 5.4 K/MM3 (ref 4.8–10.8)

## 2022-10-12 PROCEDURE — 83036 HEMOGLOBIN GLYCOSYLATED A1C: CPT

## 2022-10-12 PROCEDURE — 36415 COLL VENOUS BLD VENIPUNCTURE: CPT

## 2022-10-12 PROCEDURE — 80053 COMPREHEN METABOLIC PANEL: CPT

## 2022-10-12 PROCEDURE — 85025 COMPLETE CBC W/AUTO DIFF WBC: CPT

## 2023-02-24 ENCOUNTER — HOSPITAL ENCOUNTER (OUTPATIENT)
Age: 73
End: 2023-02-24
Payer: MEDICARE

## 2023-02-24 DIAGNOSIS — E11.9: ICD-10-CM

## 2023-02-24 DIAGNOSIS — D69.6: ICD-10-CM

## 2023-02-24 DIAGNOSIS — Z79.84: ICD-10-CM

## 2023-02-24 DIAGNOSIS — I10: Primary | ICD-10-CM

## 2023-02-24 LAB
ALBUMIN LEVEL: 2.9 G/DL (ref 3.5–5)
ALBUMIN/GLOB SERPL: 0.9 {RATIO} (ref 1.1–1.8)
ALP ISO SERPL-ACNC: 115 U/L (ref 38–126)
ALT SERPLBLD-CCNC: 33 U/L (ref 12–78)
ANION GAP SERPL CALC-SCNC: 3.7 MEQ/L (ref 5–15)
AST SERPL QL: 63 U/L (ref 14–36)
BILIRUBIN,TOTAL: 2 MG/DL (ref 0.2–1.3)
BUN SERPL-MCNC: 10 MG/DL (ref 7–17)
CALCIUM SPEC-MCNC: 8.5 MG/DL (ref 8.4–10.2)
CHLORIDE SPEC-SCNC: 108 MMOL/L (ref 98–107)
CHOLEST SPEC-SCNC: 150 MG/DL (ref 140–200)
CO2 SERPL-SCNC: 30 MMOL/L (ref 22–30)
CREAT BLD-SCNC: 0.6 MG/DL (ref 0.52–1.04)
ESTIMATED GLOMERULAR FILT RATE: 98 ML/MIN (ref 60–?)
GFR (AFRICAN AMERICAN): 119 ML/MIN (ref 60–?)
GLOBULIN SER CALC-MCNC: 3.4 G/DL (ref 1.3–3.2)
GLUCOSE: 141 MG/DL (ref 74–100)
HBA1C MFR BLD: 6.5 % (ref 4–6)
HCT VFR BLD CALC: 38.7 % (ref 37–47)
HDLC SERPL-MCNC: 41 MG/DL (ref 40–60)
HGB BLD-MCNC: 13.2 G/DL (ref 12.2–16.2)
MCHC RBC-ENTMCNC: 34.1 G/DL (ref 31.8–35.4)
MCV RBC: 102.4 FL (ref 81–99)
MEAN CORPUSCULAR HEMOGLOBIN: 34.9 PG (ref 27–31.2)
PLATELET # BLD: 81 K/MM3 (ref 142–424)
POTASSIUM: 3.7 MMOL/L (ref 3.5–5.1)
PROT SERPL-MCNC: 6.3 G/DL (ref 6.3–8.2)
RBC # BLD AUTO: 3.78 M/MM3 (ref 4.2–5.4)
SODIUM SPEC-SCNC: 138 MMOL/L (ref 136–145)
TRIGLYCERIDES: 95 MG/DL (ref 30–150)
WBC # BLD AUTO: 5 K/MM3 (ref 4.8–10.8)

## 2023-02-24 PROCEDURE — 85025 COMPLETE CBC W/AUTO DIFF WBC: CPT

## 2023-02-24 PROCEDURE — 83036 HEMOGLOBIN GLYCOSYLATED A1C: CPT

## 2023-02-24 PROCEDURE — 80061 LIPID PANEL: CPT

## 2023-02-24 PROCEDURE — 36415 COLL VENOUS BLD VENIPUNCTURE: CPT

## 2023-02-24 PROCEDURE — 80053 COMPREHEN METABOLIC PANEL: CPT

## 2023-09-08 ENCOUNTER — HOSPITAL ENCOUNTER (OUTPATIENT)
Age: 73
End: 2023-09-08
Payer: MEDICARE

## 2023-09-08 DIAGNOSIS — I10: ICD-10-CM

## 2023-09-08 DIAGNOSIS — D69.6: Primary | ICD-10-CM

## 2023-09-08 DIAGNOSIS — E78.5: ICD-10-CM

## 2023-09-08 DIAGNOSIS — E11.9: ICD-10-CM

## 2023-09-08 DIAGNOSIS — E55.9: ICD-10-CM

## 2023-09-08 LAB
25-OH VITAMIN D, TOTAL: 67.6 NG/ML (ref 30–100)
ALBUMIN LEVEL: 2.8 G/DL (ref 3.5–5)
ALBUMIN/GLOB SERPL: 0.8 {RATIO} (ref 1.1–1.8)
ALP ISO SERPL-ACNC: 125 U/L (ref 38–126)
ALT SERPLBLD-CCNC: 32 U/L (ref 12–78)
ANION GAP SERPL CALC-SCNC: 8.9 MEQ/L (ref 5–15)
AST SERPL QL: 60 U/L (ref 14–36)
BILIRUBIN,TOTAL: 1.8 MG/DL (ref 0.2–1.3)
BUN SERPL-MCNC: 11 MG/DL (ref 7–17)
CALCIUM SPEC-MCNC: 9 MG/DL (ref 8.4–10.2)
CHLORIDE SPEC-SCNC: 109 MMOL/L (ref 98–107)
CHOLEST SPEC-SCNC: 137 MG/DL (ref 140–200)
CO2 SERPL-SCNC: 28 MMOL/L (ref 22–30)
CREAT BLD-SCNC: 0.7 MG/DL (ref 0.52–1.04)
ESTIMATED GLOMERULAR FILT RATE: 82 ML/MIN (ref 60–?)
GFR (AFRICAN AMERICAN): 99 ML/MIN (ref 60–?)
GLOBULIN SER CALC-MCNC: 3.5 G/DL (ref 1.3–3.2)
GLUCOSE: 120 MG/DL (ref 74–100)
HBA1C MFR BLD: 5.3 % (ref 4–6)
HCT VFR BLD CALC: 36.7 % (ref 37–47)
HDLC SERPL-MCNC: 31 MG/DL (ref 40–60)
HGB BLD-MCNC: 12.2 G/DL (ref 12.2–16.2)
MCHC RBC-ENTMCNC: 33.3 G/DL (ref 31.8–35.4)
MCV RBC: 103 FL (ref 81–99)
MEAN CORPUSCULAR HEMOGLOBIN: 34.3 PG (ref 27–31.2)
PLATELET # BLD: 87 K/MM3 (ref 142–424)
POTASSIUM: 3.9 MMOL/L (ref 3.5–5.1)
PROT SERPL-MCNC: 6.3 G/DL (ref 6.3–8.2)
RBC # BLD AUTO: 3.57 M/MM3 (ref 4.2–5.4)
SODIUM SPEC-SCNC: 142 MMOL/L (ref 136–145)
TRIGLYCERIDES: 111 MG/DL (ref 30–150)
WBC # BLD AUTO: 4.2 K/MM3 (ref 4.8–10.8)

## 2023-09-08 PROCEDURE — 36415 COLL VENOUS BLD VENIPUNCTURE: CPT

## 2023-09-08 PROCEDURE — 80061 LIPID PANEL: CPT

## 2023-09-08 PROCEDURE — 80053 COMPREHEN METABOLIC PANEL: CPT

## 2023-09-08 PROCEDURE — 83036 HEMOGLOBIN GLYCOSYLATED A1C: CPT

## 2023-09-08 PROCEDURE — 82570 ASSAY OF URINE CREATININE: CPT

## 2023-09-08 PROCEDURE — 82043 UR ALBUMIN QUANTITATIVE: CPT

## 2023-09-08 PROCEDURE — 85025 COMPLETE CBC W/AUTO DIFF WBC: CPT

## 2023-09-08 PROCEDURE — 82306 VITAMIN D 25 HYDROXY: CPT

## 2024-03-15 ENCOUNTER — HOSPITAL ENCOUNTER (OUTPATIENT)
Dept: HOSPITAL 22 - LAB | Age: 74
End: 2024-03-15
Payer: MEDICARE

## 2024-03-15 DIAGNOSIS — E78.5: ICD-10-CM

## 2024-03-15 DIAGNOSIS — E11.9: ICD-10-CM

## 2024-03-15 DIAGNOSIS — I10: Primary | ICD-10-CM

## 2024-03-15 DIAGNOSIS — D69.6: ICD-10-CM

## 2024-03-15 LAB
ALBUMIN LEVEL: 2.9 G/DL (ref 3.5–5)
ALBUMIN/GLOB SERPL: 0.9 {RATIO} (ref 1.1–1.8)
ALP ISO SERPL-ACNC: 122 U/L (ref 38–126)
ALT SERPLBLD-CCNC: 38 U/L (ref 12–78)
ANION GAP SERPL CALC-SCNC: 6.7 MEQ/L (ref 5–15)
AST SERPL QL: 70 U/L (ref 14–36)
BILIRUBIN,TOTAL: 2 MG/DL (ref 0.2–1.3)
BUN SERPL-MCNC: 14 MG/DL (ref 7–17)
CALCIUM SPEC-MCNC: 9.3 MG/DL (ref 8.4–10.2)
CHLORIDE SPEC-SCNC: 108 MMOL/L (ref 98–107)
CHOLEST SPEC-SCNC: 153 MG/DL (ref 140–200)
CO2 SERPL-SCNC: 28 MMOL/L (ref 22–30)
CREATININE,SERUM: 0.7 MG/DL (ref 0.52–1.04)
ESTIMATED GLOMERULAR FILT RATE: 82 ML/MIN (ref 60–?)
GFR (AFRICAN AMERICAN): 99 ML/MIN (ref 60–?)
GLOBULIN SER CALC-MCNC: 3.3 G/DL (ref 1.3–3.2)
GLUCOSE: 117 MG/DL (ref 74–100)
HBA1C MFR BLD: 5 % (ref 4–6)
HCT VFR BLD CALC: 38.1 % (ref 37–47)
HDLC SERPL-MCNC: 43 MG/DL (ref 40–60)
HGB BLD-MCNC: 13.2 G/DL (ref 12.2–16.2)
MCHC RBC-ENTMCNC: 34.7 G/DL (ref 31.8–35.4)
MCV RBC: 106.4 FL (ref 81–99)
MEAN CORPUSCULAR HEMOGLOBIN: 36.9 PG (ref 27–31.2)
PLATELET # BLD: 68 K/MM3 (ref 142–424)
POTASSIUM: 3.7 MMOL/L (ref 3.5–5.1)
PROT SERPL-MCNC: 6.2 G/DL (ref 6.3–8.2)
RBC # BLD AUTO: 3.58 M/MM3 (ref 4.2–5.4)
SODIUM SPEC-SCNC: 139 MMOL/L (ref 136–145)
TRIGLYCERIDES: 82 MG/DL (ref 30–150)
WBC # BLD AUTO: 4.5 K/MM3 (ref 4.8–10.8)

## 2024-03-15 PROCEDURE — 83036 HEMOGLOBIN GLYCOSYLATED A1C: CPT

## 2024-03-15 PROCEDURE — 85025 COMPLETE CBC W/AUTO DIFF WBC: CPT

## 2024-03-15 PROCEDURE — 80053 COMPREHEN METABOLIC PANEL: CPT

## 2024-03-15 PROCEDURE — 80061 LIPID PANEL: CPT

## 2024-03-15 PROCEDURE — 36415 COLL VENOUS BLD VENIPUNCTURE: CPT

## 2024-05-30 ENCOUNTER — HOSPITAL ENCOUNTER (OUTPATIENT)
Dept: HOSPITAL 22 - RT | Age: 74
Discharge: HOME | End: 2024-05-30
Payer: MEDICARE

## 2024-05-30 DIAGNOSIS — R60.1: ICD-10-CM

## 2024-05-30 DIAGNOSIS — M79.604: ICD-10-CM

## 2024-05-30 PROCEDURE — 93971 EXTREMITY STUDY: CPT

## 2024-07-05 ENCOUNTER — HOSPITAL ENCOUNTER (OUTPATIENT)
Dept: HOSPITAL 22 - LAB | Age: 74
Discharge: HOME | End: 2024-07-05
Payer: MEDICARE

## 2024-07-05 DIAGNOSIS — E11.69: ICD-10-CM

## 2024-07-05 DIAGNOSIS — I10: Primary | ICD-10-CM

## 2024-07-05 DIAGNOSIS — E55.9: ICD-10-CM

## 2024-07-05 LAB
25-OH VITAMIN D, TOTAL: 57 NG/ML (ref 30–100)
ALBUMIN LEVEL: 2.7 G/DL (ref 3.5–5)
ALBUMIN/GLOB SERPL: 0.8 {RATIO} (ref 1.1–1.8)
ALP ISO SERPL-ACNC: 119 U/L (ref 38–126)
ALT SERPLBLD-CCNC: 29 U/L (ref 12–78)
ANION GAP SERPL CALC-SCNC: 5.1 MEQ/L (ref 5–15)
AST SERPL QL: 58 U/L (ref 14–36)
BILIRUBIN,TOTAL: 1.6 MG/DL (ref 0.2–1.3)
BUN SERPL-MCNC: 10 MG/DL (ref 7–17)
CALCIUM SPEC-MCNC: 9.3 MG/DL (ref 8.4–10.2)
CHLORIDE SPEC-SCNC: 109 MMOL/L (ref 98–107)
CO2 SERPL-SCNC: 26 MMOL/L (ref 22–30)
CREAT BLD-SCNC: 0.7 MG/DL (ref 0.52–1.04)
ESTIMATED GLOMERULAR FILT RATE: 82 ML/MIN (ref 60–?)
GFR (AFRICAN AMERICAN): 99 ML/MIN (ref 60–?)
GLOBULIN SER CALC-MCNC: 3.4 G/DL (ref 1.3–3.2)
GLUCOSE: 93 MG/DL (ref 74–100)
HBA1C MFR BLD: 5.4 % (ref 4–6)
HCT VFR BLD CALC: 34.5 % (ref 37–47)
HGB BLD-MCNC: 12 G/DL (ref 12.2–16.2)
MCHC RBC-ENTMCNC: 34.9 G/DL (ref 31.8–35.4)
MCV RBC: 105.4 FL (ref 81–99)
MEAN CORPUSCULAR HEMOGLOBIN: 36.7 PG (ref 27–31.2)
PLATELET # BLD: 67 K/MM3 (ref 142–424)
POTASSIUM: 4.1 MMOL/L (ref 3.5–5.1)
PROT SERPL-MCNC: 6.1 G/DL (ref 6.3–8.2)
RBC # BLD AUTO: 3.28 M/MM3 (ref 4.2–5.4)
SODIUM SPEC-SCNC: 136 MMOL/L (ref 136–145)
WBC # BLD AUTO: 4.2 K/MM3 (ref 4.8–10.8)

## 2024-07-05 PROCEDURE — 36415 COLL VENOUS BLD VENIPUNCTURE: CPT

## 2024-07-05 PROCEDURE — 83036 HEMOGLOBIN GLYCOSYLATED A1C: CPT

## 2024-07-05 PROCEDURE — 85025 COMPLETE CBC W/AUTO DIFF WBC: CPT

## 2024-07-05 PROCEDURE — 82306 VITAMIN D 25 HYDROXY: CPT

## 2024-07-05 PROCEDURE — 80053 COMPREHEN METABOLIC PANEL: CPT

## 2024-10-29 ENCOUNTER — HOSPITAL ENCOUNTER (OUTPATIENT)
Dept: HOSPITAL 22 - LAB | Age: 74
Discharge: HOME | End: 2024-10-29
Payer: MEDICARE

## 2024-10-29 DIAGNOSIS — I10: Primary | ICD-10-CM

## 2024-10-29 DIAGNOSIS — E78.5: ICD-10-CM

## 2024-10-29 DIAGNOSIS — E11.9: ICD-10-CM

## 2024-10-29 LAB
ALBUMIN LEVEL: 2.4 G/DL (ref 3.5–5)
ALBUMIN/GLOB SERPL: 0.8 {RATIO} (ref 1.1–1.8)
ALP ISO SERPL-ACNC: 105 U/L (ref 38–126)
ALT SERPLBLD-CCNC: 30 U/L (ref 12–78)
ANION GAP SERPL CALC-SCNC: 6.8 MEQ/L (ref 5–15)
AST SERPL QL: 53 U/L (ref 14–36)
BILIRUBIN,TOTAL: 1.9 MG/DL (ref 0.2–1.3)
BUN SERPL-MCNC: 11 MG/DL (ref 7–17)
CALCIUM SPEC-MCNC: 8.8 MG/DL (ref 8.4–10.2)
CHLORIDE SPEC-SCNC: 108 MMOL/L (ref 98–107)
CHOLEST SPEC-SCNC: 136 MG/DL (ref 140–200)
CO2 SERPL-SCNC: 28 MMOL/L (ref 22–30)
CREAT BLD-SCNC: 0.8 MG/DL (ref 0.52–1.04)
ESTIMATED GLOMERULAR FILT RATE: 70 ML/MIN (ref 60–?)
GFR (AFRICAN AMERICAN): 85 ML/MIN (ref 60–?)
GLOBULIN SER CALC-MCNC: 3.2 G/DL (ref 1.3–3.2)
GLUCOSE: 109 MG/DL (ref 74–100)
HBA1C MFR BLD: 4.7 % (ref 4–6)
HCT VFR BLD CALC: 34.6 % (ref 37–47)
HDLC SERPL-MCNC: 39 MG/DL (ref 40–60)
HGB BLD-MCNC: 12.3 G/DL (ref 12.2–16.2)
MCHC RBC-ENTMCNC: 35.4 G/DL (ref 31.8–35.4)
MCV RBC: 102.3 FL (ref 81–99)
MEAN CORPUSCULAR HEMOGLOBIN: 36.2 PG (ref 27–31.2)
PLATELET # BLD: 69 K/MM3 (ref 142–424)
POTASSIUM: 3.8 MMOL/L (ref 3.5–5.1)
PROT SERPL-MCNC: 5.6 G/DL (ref 6.3–8.2)
RBC # BLD AUTO: 3.38 M/MM3 (ref 4.2–5.4)
SODIUM SPEC-SCNC: 139 MMOL/L (ref 136–145)
TRIGLYCERIDES: 74 MG/DL (ref 30–150)
WBC # BLD AUTO: 3.9 K/MM3 (ref 4.8–10.8)

## 2024-10-29 PROCEDURE — 85025 COMPLETE CBC W/AUTO DIFF WBC: CPT

## 2024-10-29 PROCEDURE — 80053 COMPREHEN METABOLIC PANEL: CPT

## 2024-10-29 PROCEDURE — 80061 LIPID PANEL: CPT

## 2024-10-29 PROCEDURE — 83036 HEMOGLOBIN GLYCOSYLATED A1C: CPT

## 2024-10-29 PROCEDURE — 36415 COLL VENOUS BLD VENIPUNCTURE: CPT

## 2025-01-22 ENCOUNTER — TELEPHONE (OUTPATIENT)
Dept: CARDIOLOGY | Facility: CLINIC | Age: 75
End: 2025-01-22
Payer: MEDICARE

## 2025-01-30 ENCOUNTER — OFFICE VISIT (OUTPATIENT)
Dept: CARDIOLOGY | Facility: CLINIC | Age: 75
End: 2025-01-30
Payer: MEDICARE

## 2025-01-30 VITALS
DIASTOLIC BLOOD PRESSURE: 64 MMHG | BODY MASS INDEX: 36.91 KG/M2 | SYSTOLIC BLOOD PRESSURE: 132 MMHG | HEART RATE: 59 BPM | OXYGEN SATURATION: 99 % | HEIGHT: 64 IN | WEIGHT: 216.2 LBS

## 2025-01-30 DIAGNOSIS — I49.1 PAC (PREMATURE ATRIAL CONTRACTION): ICD-10-CM

## 2025-01-30 DIAGNOSIS — K76.6 PORTAL HYPERTENSION: ICD-10-CM

## 2025-01-30 DIAGNOSIS — I10 HYPERTENSION, ESSENTIAL: Primary | ICD-10-CM

## 2025-01-30 DIAGNOSIS — I51.81 TAKOTSUBO CARDIOMYOPATHY: ICD-10-CM

## 2025-01-30 DIAGNOSIS — D69.6 THROMBOCYTOPENIA: ICD-10-CM

## 2025-01-30 RX ORDER — SPIRONOLACTONE 25 MG/1
1 TABLET ORAL 2 TIMES DAILY
COMMUNITY
Start: 2024-10-30

## 2025-01-30 RX ORDER — SEMAGLUTIDE 0.68 MG/ML
0.25 INJECTION, SOLUTION SUBCUTANEOUS WEEKLY
COMMUNITY
Start: 2024-10-30

## 2025-01-30 NOTE — PROGRESS NOTES
OFFICE VISIT  NOTE  [facility] CARDIOLOGY      Name: Marjorie Ford    Date: 2025  MRN:  [id number]  :  1950      REFERRING/PRIMARY PROVIDER:  Maria Del Carmen Edwards APRN    Chief Complaint   Patient presents with    Coronary Artery Disease       HPI: Marjorie Ford is a 74 y.o. female who presents for history of Takotsubo cardiomyopathy and hypertension.  Former patient of Dr. Escobar at [facility] in clinic.  In  after her   (her  had a heart transplant and liver 8 years after the transplant), she developed Takotsubo cardiomyopathy, she had normal heart catheterization 2014 and EF had returned to normal.  Social history history of portal hypertension and thrombocytopenia as well as obesity with a BMI of 37 today.  She is doing well denies chest pain or shortness of breath currently.    Past Medical History:   Diagnosis Date    Anxiety     Arthritis     Broken heart syndrome     Diabetes     High blood pressure     Low platelet count     Murmur        Past Surgical History:   Procedure Laterality Date    BACK SURGERY      CHOLECYSTECTOMY OPEN      HYSTERECTOMY      bso    TUBAL ABDOMINAL LIGATION         Social History     Socioeconomic History    Marital status:     Number of children: 3   Tobacco Use    Smoking status: Never    Smokeless tobacco: Never   Substance and Sexual Activity    Alcohol use: No    Drug use: No    Sexual activity: Defer       Family History   Problem Relation Age of Onset    Aneurysm Mother     Aneurysm Maternal Uncle         ROS:   Constitutional no fever,  no weight loss   Skin no rash, no subcutaneous nodules   Otolaryngeal no difficulty swallowing   Cardiovascular See HPI   Pulmonary no cough, no sputum production   Gastrointestinal no constipation, no diarrhea   Genitourinary no dysuria, no hematuria   Hematologic no easy bruisability, no abnormal bleeding   Musculoskeletal no muscle pain   Neurologic no dizziness, no  "falls         Allergies   Allergen Reactions    Sulfa Antibiotics Nausea And Vomiting         Current Outpatient Medications:     Calcium Carbonate-Vitamin D (CALTRATE 600+D PO), Take  by mouth 2 (Two) Times a Day., Disp: , Rfl:     escitalopram (LEXAPRO) 10 MG tablet, Take 1 tablet by mouth As Needed., Disp: , Rfl:     losartan-hydrochlorothiazide (HYZAAR) 100-25 MG per tablet, Take 1 tablet by mouth Daily., Disp: , Rfl:     Ozempic, 0.25 or 0.5 MG/DOSE, 2 MG/3ML solution pen-injector, Inject 0.25 mg under the skin into the appropriate area as directed 1 (One) Time Per Week., Disp: , Rfl:     spironolactone (ALDACTONE) 25 MG tablet, Take 1 tablet by mouth 2 (Two) Times a Day., Disp: , Rfl:     Vitals:    01/30/25 1019   BP: 132/64   BP Location: Left arm   Patient Position: Sitting   Pulse: 59   SpO2: 99%   Weight: 98.1 kg (216 lb 3.2 oz)   Height: 162.6 cm (64\")     Body mass index is 37.11 kg/m².    PHYSICAL EXAM:    General Appearance:   well developed  well nourished, no acute distress.  HENT:   oropharynx moist  lips not cyanotic  Neck:  thyroid not enlarged  supple  Respiratory:  no respiratory distress  normal breath sounds  no rales  Cardiovascular:  no jugular venous distention  regular rhythm  apical impulse normal  S1 normal, S2 normal  no S3, no S4   no murmur  no rub, no thrill  carotid pulses normal; no bruit  lower extremity edema: none      Musculoskeletal:  no clubbing of fingers.   normocephalic, head atraumatic  Skin:   warm, dry  Psychiatric:  judgement and insight appropriate  normal mood and affect    RESULTS:     ECG 12 Lead    Date/Time: 1/30/2025 10:40 AM  Performed by: Anirudh Oliva MD    Authorized by: Anirudh Oliva MD  Comparison: not compared with previous ECG   Previous ECG: no previous ECG available  Rhythm: sinus bradycardia  Ectopy: atrial premature contractions  Rate: bradycardic  BPM: 57  QRS axis: normal    Clinical impression: non-specific ECG                Labs:  Lab " "Results   Component Value Date    TRIG 93 2014    HDL 30 (L) 2014    LDL 96 2014    AST 61 (H) 05/10/2019    ALT 46 (H) 05/10/2019     Lab Results   Component Value Date    HGBA1C 7.0 (H) 2014     No components found for: \"CREATINININE\"  eGFR Non  Amer   Date Value Ref Range Status   05/10/2019 105 >60 mL/min/1.73 Final       Most recent PCP note, imaging tests, and labs reviewed.    ASSESSMENT:  Problem List Items Addressed This Visit       Thrombocytopenia    Portal hypertension    Takotsubo cardiomyopathy    Overview      after  .  Normal [facility]          Hypertension, essential - Primary    Relevant Medications    spironolactone (ALDACTONE) 25 MG tablet    PAC (premature atrial contraction)       PLAN:    1.  History of Takotsubo cardiomyopathy:  EF reverted to normal with normal cath 2014  Continue monitoring for symptoms, repeat echo if she has shortness of breath.  Continue exercise and healthy lifestyle    2.  Primary hypertension:  Goal blood pressure less than 130/80  Currently well-controlled on spironolactone and losartan/HCTZ, continue for now.    3.  Obesity:  BMI 37, discussed importance of weight loss and continue GLP-1 agonist.    4.  PACs:  Fairly asymptomatic and avoid beta-blocker at this time given low resting heart rate of 57.    Advance Care Planning   ACP discussion was held with the patient during this visit. Patient has an advance directive (not in EMR), copy requested.         Return to clinic in 12 months, or sooner as needed.    Thank you for the opportunity to share in the care of your patient; please do not hesitate to call me with any questions.     Anirudh Oliva MD, [facility], [facility] [place] [facility]  Office: [phone]  [facility] [place] [address]    25    "

## 2025-04-09 ENCOUNTER — HOSPITAL ENCOUNTER (OUTPATIENT)
Dept: HOSPITAL 22 - RAD | Age: 75
Discharge: HOME | End: 2025-04-09
Payer: MEDICARE

## 2025-04-09 DIAGNOSIS — R79.89: Primary | ICD-10-CM

## 2025-04-09 DIAGNOSIS — K76.0: ICD-10-CM

## 2025-04-09 PROCEDURE — 76705 ECHO EXAM OF ABDOMEN: CPT
